# Patient Record
Sex: FEMALE | Race: BLACK OR AFRICAN AMERICAN | Employment: OTHER | URBAN - METROPOLITAN AREA
[De-identification: names, ages, dates, MRNs, and addresses within clinical notes are randomized per-mention and may not be internally consistent; named-entity substitution may affect disease eponyms.]

---

## 2019-01-18 ENCOUNTER — OP HISTORICAL/CONVERTED ENCOUNTER (OUTPATIENT)
Dept: OTHER | Age: 69
End: 2019-01-18

## 2019-02-19 ENCOUNTER — OP HISTORICAL/CONVERTED ENCOUNTER (OUTPATIENT)
Dept: OTHER | Age: 69
End: 2019-02-19

## 2019-08-02 ENCOUNTER — OP HISTORICAL/CONVERTED ENCOUNTER (OUTPATIENT)
Dept: OTHER | Age: 69
End: 2019-08-02

## 2020-11-20 PROBLEM — E55.9 VITAMIN D DEFICIENCY: Status: ACTIVE | Noted: 2020-11-20

## 2020-11-20 PROBLEM — Z12.9 SCREENING FOR CANCER: Status: ACTIVE | Noted: 2020-11-20

## 2020-11-20 PROBLEM — N76.0 ACUTE VAGINITIS: Status: ACTIVE | Noted: 2020-11-20

## 2020-11-20 PROBLEM — F17.200 NICOTINE DEPENDENCE: Status: ACTIVE | Noted: 2020-11-20

## 2020-11-20 PROBLEM — M85.80 OSTEOPENIA: Status: ACTIVE | Noted: 2020-11-20

## 2020-11-20 PROBLEM — E78.00 PURE HYPERCHOLESTEROLEMIA: Status: ACTIVE | Noted: 2020-11-20

## 2020-11-20 PROBLEM — R53.83 FATIGUE: Status: ACTIVE | Noted: 2020-11-20

## 2020-11-20 PROBLEM — F17.210 CIGARETTE SMOKER: Status: ACTIVE | Noted: 2020-11-20

## 2020-11-23 ENCOUNTER — OFFICE VISIT (OUTPATIENT)
Dept: INTERNAL MEDICINE CLINIC | Age: 70
End: 2020-11-23
Payer: MEDICARE

## 2020-11-23 VITALS
RESPIRATION RATE: 18 BRPM | SYSTOLIC BLOOD PRESSURE: 128 MMHG | HEIGHT: 66 IN | HEART RATE: 85 BPM | BODY MASS INDEX: 21.24 KG/M2 | WEIGHT: 132.2 LBS | DIASTOLIC BLOOD PRESSURE: 82 MMHG | TEMPERATURE: 99.3 F | OXYGEN SATURATION: 97 %

## 2020-11-23 DIAGNOSIS — Z13.6 ENCOUNTER FOR ABDOMINAL AORTIC ANEURYSM (AAA) SCREENING: ICD-10-CM

## 2020-11-23 DIAGNOSIS — M85.88 OSTEOPENIA OF LUMBAR SPINE: ICD-10-CM

## 2020-11-23 DIAGNOSIS — F17.210 CIGARETTE NICOTINE DEPENDENCE WITHOUT COMPLICATION: ICD-10-CM

## 2020-11-23 DIAGNOSIS — Z00.00 MEDICARE ANNUAL WELLNESS VISIT, SUBSEQUENT: Primary | ICD-10-CM

## 2020-11-23 PROCEDURE — G0439 PPPS, SUBSEQ VISIT: HCPCS | Performed by: INTERNAL MEDICINE

## 2020-11-23 RX ORDER — CHOLECALCIFEROL (VITAMIN D3) 125 MCG
1 CAPSULE ORAL DAILY
COMMUNITY

## 2020-11-23 RX ORDER — IBUPROFEN 200 MG
1 TABLET ORAL EVERY 24 HOURS
Qty: 30 PATCH | Refills: 1 | Status: SHIPPED | OUTPATIENT
Start: 2020-11-23 | End: 2020-12-23

## 2020-11-23 NOTE — PROGRESS NOTES
This is the Subsequent Medicare Annual Wellness Exam, performed 12 months or more after the Initial AWV or the last Subsequent AWV    I have reviewed the patient's medical history in detail and updated the computerized patient record. /82 (BP 1 Location: Left arm, BP Patient Position: Sitting)   Pulse 85   Temp 99.3 °F (37.4 °C) (Oral)   Resp 18   Ht 5' 6\" (1.676 m)   Wt 132 lb 3.2 oz (60 kg)   SpO2 97%   BMI 21.34 kg/m²      Review of Systems   Constitutional: Negative. HENT: Negative for congestion, hearing loss and sore throat. Eyes: Negative for blurred vision and double vision. Respiratory: Negative for cough, sputum production, shortness of breath and wheezing. Cardiovascular: Negative. Gastrointestinal: Negative. Genitourinary: Negative. Musculoskeletal: Negative. Neurological: Negative for dizziness, tingling and headaches. Endo/Heme/Allergies: Negative for polydipsia. Psychiatric/Behavioral: Negative for depression and substance abuse. The patient is not nervous/anxious and does not have insomnia. Depression Risk Factor Screening:     3 most recent PHQ Screens 11/23/2020   Little interest or pleasure in doing things Not at all   Feeling down, depressed, irritable, or hopeless Not at all   Total Score PHQ 2 0       Alcohol Risk Screen   Do you average more than 1 drink per night or more than 7 drinks a week:  No    On any one occasion in the past three months have you have had more than 3 drinks containing alcohol:  No        Functional Ability and Level of Safety:   Hearing: Hearing is good. Activities of Daily Living: The home contains: no safety equipment. Patient does total self care     Ambulation: with no difficulty     Fall Risk:  Fall Risk Assessment, last 12 mths 11/23/2020   Able to walk? Yes   Fall in past 12 months?  No     Abuse Screen:  Patient is not abused       Cognitive Screening   Has your family/caregiver stated any concerns about your memory: no     Cognitive Screening: Normal - Clock Drawing Test    Assessment/Plan   Education and counseling provided:  Are appropriate based on today's review and evaluation  End-of-Life planning (with patient's consent)  Pneumococcal Vaccine  Influenza Vaccine  Screening Mammography  Bone mass measurement (DEXA)  Screening for glaucoma    Diagnoses and all orders for this visit:    1. Medicare annual wellness visit, subsequent  -     CT LOW DOSE LUNG CANCER SCREENING; Future  -     US EXAM SCREENING AAA; Future    2. Cigarette nicotine dependence without complication  -     CT LOW DOSE LUNG CANCER SCREENING; Future    3. Osteopenia of lumbar spine    4. Encounter for abdominal aortic aneurysm (AAA) screening  -     DUPLEX AORTA/IVC/ILIAC/GRAFTS COMPLETE; Future  -     US EXAM SCREENING AAA; Future    Other orders  -     nicotine (NICODERM CQ) 21 mg/24 hr; 1 Patch by TransDERmal route every twenty-four (24) hours for 30 days. She declined for all age-appropriate preventive vaccinations and she is going to think over for tetanus vaccine,. According to her she has done her mammogram in September 2020 at [facility] and she will bring the copy of the results to me she has done Cologuard test, and she is negative.,  She has done bone density in February 2019 and she has osteopenia in the lumbar spine and femur, she does not want to be on bisphosphonates or injection and she wants to just continue vitamin D and calcium. ,  She has done her labs in May 2020,  her blood pressure is well controlled. She is smoking about 1 pack of cigarettes lasting for 2 days and so far she is smoking about 144 packs/year she agreed as a shared decision to do low-dose lung CT scan is baseline, I explained and  we made a shared decision with her consent I ordered low-dose CT lung screening, also, she wants to do AAA screening, I ordered.   Continued on vitamin D and calcium and her blood pressure is wonderfully controlled without being on any antihypertensives. She has no depression. She is independent for ADL and IADL she has done her Mini-Mental test including 3 words recall and clock drawing test which is normal.  She told me she is going to make appointment with ophthalmologist.  She has no hearing or vision complaints. She does not drink alcohol, daily. Answered all her questions. Follow-up in 1 year. She has her labs done in May 2020 and I have reviewed and discussed with her she does not want to do labs for now.         Health Maintenance Due     Health Maintenance Due   Topic Date Due    Hepatitis C Screening  1950    DTaP/Tdap/Td series (1 - Tdap) 1971    Lipid Screen  1990    Shingrix Vaccine Age 50> (1 of 2) 2000    Colorectal Cancer Screening Combo  2000    Breast Cancer Screen Mammogram  2000    GLAUCOMA SCREENING Q2Y  2015    Bone Densitometry (Dexa) Screening  2015    Pneumococcal 65+ years (1 of 1 - PPSV23) 2015    Medicare Yearly Exam  2020       Patient Care Team   Patient Care Team:  Amanda Saini MD as PCP - General (Internal Medicine)    History     Patient Active Problem List   Diagnosis Code    Acute vaginitis N76.0    Cigarette smoker F17.210    Fatigue R53.83    Nicotine dependence F17.200    Osteopenia M85.80    Pure hypercholesterolemia E78.00    Screening for cancer Z12.9    Vitamin D deficiency E55.9    Medicare annual wellness visit, subsequent Z00.00    Encounter for abdominal aortic aneurysm (AAA) screening Z13.6     Past Medical History:   Diagnosis Date    Acute vaginitis 2020    Cigarette smoker 2020    Fatigue 2020    Nicotine dependence 2020    Osteopenia 2020    Pure hypercholesterolemia 2020    Screening for cancer 2020    Vitamin D deficiency 2020      Past Surgical History:   Procedure Laterality Date    HX  SECTION      HX COLONOSCOPY  2013     Current Outpatient Medications   Medication Sig Dispense Refill    calcium carbonate/vitamin D3 (CALTRATE 600 + D PO) Take 1 Tab by mouth daily.  cholecalciferol, vitamin D3, 50 mcg (2,000 unit) tab Take 1 Tab by mouth daily.  multivit/iron/FA/K/herb no. 244 (ALIVE WOMEN'S ENERGY PO) Take 1 Tab by mouth daily.  nicotine (NICODERM CQ) 21 mg/24 hr 1 Patch by TransDERmal route every twenty-four (24) hours for 30 days.  30 Patch 1     Allergies   Allergen Reactions    Influenza Vaccine Tri-Sp 09-10 Vertigo       Family History   Problem Relation Age of Onset    Hypertension Mother      Social History     Tobacco Use    Smoking status: Current Every Day Smoker     Packs/day: 0.50     Years: 10.00     Pack years: 5.00    Smokeless tobacco: Never Used   Substance Use Topics    Alcohol use: Not Currently

## 2020-11-23 NOTE — PATIENT INSTRUCTIONS
Medicare Wellness Visit, Female The best way to live healthy is to have a lifestyle where you eat a well-balanced diet, exercise regularly, limit alcohol use, and quit all forms of tobacco/nicotine, if applicable. Regular preventive services are another way to keep healthy. Preventive services (vaccines, screening tests, monitoring & exams) can help personalize your care plan, which helps you manage your own care. Screening tests can find health problems at the earliest stages, when they are easiest to treat. [facility] follows the current, evidence-based guidelines published by the [facility] Louie Schafer ([facility]) when recommending preventive services for our patients. Because we follow these guidelines, sometimes recommendations change over time as research supports it. (For example, mammograms used to be recommended annually. Even though Medicare will still pay for an annual mammogram, the newer guidelines recommend a mammogram every two years for women of average risk). Of course, you and your doctor may decide to screen more often for some diseases, based on your risk and your co-morbidities (chronic disease you are already diagnosed with). Preventive services for you include: - Medicare offers their members a free annual wellness visit, which is time for you and your primary care provider to discuss and plan for your preventive service needs. Take advantage of this benefit every year! 
-All adults over the age of 72 should receive the recommended pneumonia vaccines. Current USPSTF guidelines recommend a series of two vaccines for the best pneumonia protection.  
-All adults should have a flu vaccine yearly and a tetanus vaccine every 10 years.  
-All adults age 48 and older should receive the shingles vaccines (series of two vaccines). -All adults age 38-68 who are overweight should have a diabetes screening test once every three years. -All adults born between [birth year] and 1965 should be screened once for Hepatitis C. 
-Other screening tests and preventive services for persons with diabetes include: an eye exam to screen for diabetic retinopathy, a kidney function test, a foot exam, and stricter control over your cholesterol.  
-Cardiovascular screening for adults with routine risk involves an electrocardiogram (ECG) at intervals determined by your doctor.  
-Colorectal cancer screenings should be done for adults age 54-65 with no increased risk factors for colorectal cancer. There are a number of acceptable methods of screening for this type of cancer. Each test has its own benefits and drawbacks. Discuss with your doctor what is most appropriate for you during your annual wellness visit. The different tests include: colonoscopy (considered the best screening method), a fecal occult blood test, a fecal DNA test, and sigmoidoscopy. 
 
-A bone mass density test is recommended when a woman turns 65 to screen for osteoporosis. This test is only recommended one time, as a screening. Some providers will use this same test as a disease monitoring tool if you already have osteoporosis. -Breast cancer screenings are recommended every other year for women of normal risk, age 54-69. 
-Cervical cancer screenings for women over age 72 are only recommended with certain risk factors. Here is a list of your current Health Maintenance items (your personalized list of preventive services) with a due date: 
Health Maintenance Due Topic Date Due  
 Hepatitis C Test  1950  DTaP/Tdap/Td  (1 - Tdap) 02/06/1971  Cholesterol Test   02/06/1990  Shingles Vaccine (1 of 2) 02/06/2000  Colorectal Screening  02/06/2000  Mammogram  02/06/2000  Glaucoma Screening   02/06/2015  Bone Mineral Density   02/06/2015  Pneumococcal Vaccine (1 of 1 - PPSV23) 02/06/2015 Nancy Pham Annual Well Visit  04/16/2020

## 2020-12-20 PROBLEM — Z12.9 SCREENING FOR CANCER: Status: RESOLVED | Noted: 2020-11-20 | Resolved: 2020-12-20

## 2020-12-23 PROBLEM — Z13.6 ENCOUNTER FOR ABDOMINAL AORTIC ANEURYSM (AAA) SCREENING: Status: RESOLVED | Noted: 2020-11-23 | Resolved: 2020-12-23

## 2020-12-23 PROBLEM — Z00.00 MEDICARE ANNUAL WELLNESS VISIT, SUBSEQUENT: Status: RESOLVED | Noted: 2020-11-23 | Resolved: 2020-12-23

## 2021-01-17 DIAGNOSIS — R91.8 ABNORMAL CT LUNG SCREENING: Primary | ICD-10-CM

## 2021-01-17 DIAGNOSIS — R93.89 ABNORMAL FINDING ON CT SCAN: ICD-10-CM

## 2021-01-17 DIAGNOSIS — Z00.00 MEDICARE ANNUAL WELLNESS VISIT, SUBSEQUENT: ICD-10-CM

## 2021-01-17 DIAGNOSIS — F17.210 CIGARETTE NICOTINE DEPENDENCE WITHOUT COMPLICATION: ICD-10-CM

## 2021-01-17 NOTE — PROGRESS NOTES
Please call her that she has abnormal findings and radiologist is not sure also incidental finding in liver she needs CT abdomen and pelvis with and without IV contrast multiphasic to rule out any serious pathology also we will refer her to oncologist to keep surveillance regarding different very small size nodules throughout the lung and not sure whether it is granulomatous, versus, anything serious and she needs, CT lung again in 6 months, but CT abdomen and pelvis to be done, as soon as possible and I have placed orders in the computer.   In the interval time she should quit smoking cigarette,

## 2021-01-17 NOTE — PROGRESS NOTES
I ordered CT abdomen pelvis with and without contrast as per the recommendation by radiologist who has done low-dose lung CT under Medicare wellness visit having history of smoking.

## 2021-01-19 ENCOUNTER — TELEPHONE (OUTPATIENT)
Dept: INTERNAL MEDICINE CLINIC | Age: 71
End: 2021-01-19

## 2021-01-19 NOTE — TELEPHONE ENCOUNTER
----- Message from Sanket Flores MD sent at 1/17/2021  2:16 PM EST -----  Please call her that she has abnormal findings and radiologist is not sure also incidental finding in liver she needs CT abdomen and pelvis with and without IV contrast multiphasic to rule out any serious pathology also we will refer her to oncologist to keep surveillance regarding different very small size nodules throughout the lung and not sure whether it is granulomatous, versus, anything serious and she needs, CT lung again in 6 months, but CT abdomen and pelvis to be done, as soon as possible and I have placed orders in the computer.   In the interval time she should quit smoking cigarette,

## 2021-01-20 NOTE — TELEPHONE ENCOUNTER
Spoke to patient and advised of results and additional imaging needed. New order faxed to [facility] at [phone].

## 2021-01-25 PROBLEM — R91.8 ABNORMAL CT LUNG SCREENING: Status: ACTIVE | Noted: 2021-01-25

## 2021-01-26 ENCOUNTER — OFFICE VISIT (OUTPATIENT)
Dept: INTERNAL MEDICINE CLINIC | Age: 71
End: 2021-01-26
Payer: MEDICARE

## 2021-01-26 VITALS
SYSTOLIC BLOOD PRESSURE: 114 MMHG | HEART RATE: 80 BPM | RESPIRATION RATE: 18 BRPM | DIASTOLIC BLOOD PRESSURE: 62 MMHG | BODY MASS INDEX: 18.96 KG/M2 | WEIGHT: 118 LBS | HEIGHT: 66 IN | OXYGEN SATURATION: 99 %

## 2021-01-26 DIAGNOSIS — R91.8 ABNORMAL CT LUNG SCREENING: ICD-10-CM

## 2021-01-26 DIAGNOSIS — F17.219 CIGARETTE NICOTINE DEPENDENCE WITH NICOTINE-INDUCED DISORDER: ICD-10-CM

## 2021-01-26 DIAGNOSIS — R11.0 NAUSEA: ICD-10-CM

## 2021-01-26 DIAGNOSIS — R63.0 LOSS OF APPETITE: ICD-10-CM

## 2021-01-26 DIAGNOSIS — R63.4 WEIGHT LOSS: ICD-10-CM

## 2021-01-26 PROCEDURE — G8510 SCR DEP NEG, NO PLAN REQD: HCPCS | Performed by: INTERNAL MEDICINE

## 2021-01-26 PROCEDURE — G8427 DOCREV CUR MEDS BY ELIG CLIN: HCPCS | Performed by: INTERNAL MEDICINE

## 2021-01-26 PROCEDURE — G8536 NO DOC ELDER MAL SCRN: HCPCS | Performed by: INTERNAL MEDICINE

## 2021-01-26 PROCEDURE — G8420 CALC BMI NORM PARAMETERS: HCPCS | Performed by: INTERNAL MEDICINE

## 2021-01-26 PROCEDURE — G8400 PT W/DXA NO RESULTS DOC: HCPCS | Performed by: INTERNAL MEDICINE

## 2021-01-26 PROCEDURE — 1090F PRES/ABSN URINE INCON ASSESS: CPT | Performed by: INTERNAL MEDICINE

## 2021-01-26 PROCEDURE — 99214 OFFICE O/P EST MOD 30 MIN: CPT | Performed by: INTERNAL MEDICINE

## 2021-01-26 PROCEDURE — 3017F COLORECTAL CA SCREEN DOC REV: CPT | Performed by: INTERNAL MEDICINE

## 2021-01-26 PROCEDURE — 1101F PT FALLS ASSESS-DOCD LE1/YR: CPT | Performed by: INTERNAL MEDICINE

## 2021-01-26 RX ORDER — OMEPRAZOLE 40 MG/1
40 CAPSULE, DELAYED RELEASE ORAL DAILY
Qty: 30 CAP | Refills: 2 | Status: SHIPPED | OUTPATIENT
Start: 2021-01-26

## 2021-01-26 RX ORDER — OMEPRAZOLE 40 MG/1
40 CAPSULE, DELAYED RELEASE ORAL DAILY
Qty: 90 CAP | Refills: 0 | Status: SHIPPED | OUTPATIENT
Start: 2021-01-26

## 2021-01-26 NOTE — PROGRESS NOTES
Roseanne Still is a 79 y.o. female and presents with Weight Loss and Decreased Appetite    Ms. Marquise Orantes came to discuss her low-dose lung CT scan that was done for screening lung cancer having history of smoking cigarette 1 pack a day in the past but now she has not smoked almost for last 2 weeks. According to her she has no good test and she cannot eat large portion at 1 time so sometimes she gets hungry in midnight. She has weight loss that I checked when I reviewed her vitals she has abnormal CT scan of the lung and showing some lesion in the liver and adrenal gland, having weight loss and loss of appetite and history of smoking cigarette I referred her for CT abdomen and pelvis as per recommendation by radiologist, she agreed to do in [facility]. AT [place], I referred her to gastroenterologist because she had done Cologuard and she will need EGD and colonoscopy having lack of appetite and having early satiety,. She has no depression. She lives by herself and independent for ADL and IADL. No blood in stool or urine. No vomiting. But she told me she had some nausea 3 days ago. No detail,good remote history available  Review of Systems    Review of Systems   Constitutional: Positive for malaise/fatigue. Weight loss   HENT: Negative for sinus pain and sore throat. Eyes: Negative for blurred vision. Respiratory: Negative for cough, sputum production, shortness of breath and wheezing. Cardiovascular: Negative. Gastrointestinal: Positive for nausea. Loss of appetite   Genitourinary: Negative. Musculoskeletal: Negative. Neurological: Negative for dizziness, tingling, tremors and headaches. Psychiatric/Behavioral: Negative for depression and memory loss. The patient is not nervous/anxious.          Past Medical History:   Diagnosis Date    Acute vaginitis 11/20/2020    Cigarette smoker 11/20/2020    Fatigue 11/20/2020    Nicotine dependence 11/20/2020    Osteopenia 2020    Pure hypercholesterolemia 2020    Screening for cancer 2020    Vitamin D deficiency 2020     Past Surgical History:   Procedure Laterality Date    HX  SECTION  1982    HX COLONOSCOPY       Social History     Socioeconomic History    Marital status: SINGLE     Spouse name: Not on file    Number of children: Not on file    Years of education: Not on file    Highest education level: Not on file   Tobacco Use    Smoking status: Current Every Day Smoker     Packs/day: 0.50     Years: 10.00     Pack years: 5.00    Smokeless tobacco: Never Used   Substance and Sexual Activity    Alcohol use: Not Currently     Family History   Problem Relation Age of Onset    Hypertension Mother      Current Outpatient Medications   Medication Sig Dispense Refill    omeprazole (PRILOSEC) 40 mg capsule Take 1 Cap by mouth daily. Take 30 minutes before eating. once a day. 30 Cap 2    omeprazole (PRILOSEC) 40 mg capsule Take 1 Cap by mouth daily. Take one pill 30 minutes before eating once a day 90 Cap 0    cholecalciferol, vitamin D3, 50 mcg (2,000 unit) tab Take 1 Tab by mouth daily.  multivit/iron/FA/K/herb no. 244 (ALIVE WOMEN'S ENERGY PO) Take 1 Tab by mouth daily.  calcium carbonate/vitamin D3 (CALTRATE 600 + D PO) Take 1 Tab by mouth daily. Allergies   Allergen Reactions    Influenza Vaccine Tri-Sp 09-10 Vertigo       Objective:  Visit Vitals  /62 (BP 1 Location: Right arm, BP Patient Position: Sitting)   Pulse 80   Resp 18   Ht 5' 6\" (1.676 m)   Wt 118 lb (53.5 kg)   SpO2 99%   BMI 19.05 kg/m²       Physical Exam:   Constitutional: General Appearance: Pleasant. Level of Distress: NAD. Psychiatric: Mental Status: normal mood and affect Orientation: to time, place, and person. ,normal eye contact. Head: Head: normocephalic and atraumatic. Eyes: Pupils: PERRLA. Sclerae: non-icteric. Neck: Neck: supple, trachea midline, and no masses.  Lymph Nodes: no cervical LAD. Thyroid: no enlargement or nodules and non-tender. Lungs: Respiratory effort: no dyspnea. Auscultation: no wheezing, rales/crackles, or rhonchi and breath sounds normal and good air movement. Cardiovascular: Apical Impulse: not displaced. Heart Auscultation: normal S1 and S2; no murmurs, rubs, or gallops; and RRR. Neck vessels: no carotid bruits. Pulses including femoral / pedal: normal throughout. Abdomen: Bowel Sounds: normal. Inspection and Palpation: no tenderness, guarding, or masses and soft and non-distended. Liver: non-tender and no hepatomegaly. Spleen: non-tender and no splenomegaly. Musculoskeletal[de-identified] Extremities: no edema,no varicosities. No Calf tenderness. Neurologic: Gait and Station: normal gait and station. Motor Strength normal right and left. Skin: Inspection and palpation: no rash, lesions, or ulcer. No results found for this or any previous visit. Assessment/Plan:      ICD-10-CM ICD-9-CM    1. Abnormal CT lung screening  R91.8 793.19 REFERRAL TO HEMATOLOGY ONCOLOGY      REFERRAL TO GASTROENTEROLOGY   2. Weight loss  R63.4 783.21 REFERRAL TO HEMATOLOGY ONCOLOGY      REFERRAL TO GASTROENTEROLOGY      CBC WITH AUTOMATED DIFF      METABOLIC PANEL, COMPREHENSIVE      TSH 3RD GENERATION      SED RATE (ESR)      AMYLASE      LIPASE      T4, FREE   3. Loss of appetite  R63.0 783.0 CBC WITH AUTOMATED DIFF      METABOLIC PANEL, COMPREHENSIVE      TSH 3RD GENERATION      SED RATE (ESR)      AMYLASE      LIPASE      T4, FREE   4.  Cigarette nicotine dependence with nicotine-induced disorder  F17.219 292.9 REFERRAL TO HEMATOLOGY ONCOLOGY      REFERRAL TO GASTROENTEROLOGY   5. Nausea  R11.0 787.02 CBC WITH AUTOMATED DIFF      METABOLIC PANEL, COMPREHENSIVE      TSH 3RD GENERATION      SED RATE (ESR)      AMYLASE      LIPASE     Orders Placed This Encounter    CBC WITH AUTOMATED DIFF    METABOLIC PANEL, COMPREHENSIVE    TSH 3RD GENERATION    SED RATE (ESR)    AMYLASE    LIPASE    T4, FREE    REFERRAL TO HEMATOLOGY ONCOLOGY     Referral Priority:   Routine     Referral Type:   Consultation     Referral Reason:   Specialty Services Required     Referred to Provider:   Manpreet Davis MD     Requested Specialty:   Hematology and Oncology     Number of Visits Requested:   1    REFERRAL TO GASTROENTEROLOGY     Referral Priority:   Routine     Referral Type:   Consultation     Referral Reason:   Specialty Services Required     Referred to Provider:   Leroy Benton MD     Requested Specialty:   Gastroenterology     Number of Visits Requested:   1    omeprazole (PRILOSEC) 40 mg capsule     Sig: Take 1 Cap by mouth daily. Take 30 minutes before eating. once a day. Dispense:  30 Cap     Refill:  2    omeprazole (PRILOSEC) 40 mg capsule     Sig: Take 1 Cap by mouth daily.  Take one pill 30 minutes before eating once a day     Dispense:  90 Cap     Refill:  0     Lack of appetite also weight loss noticed by me,, patient is not providing history but now she says she gets tired, fatigue and not having good taste in her mouth, and no history of fever chills or exposure to COVID-19, having history of smoking cigarette used to smoke 1 pack a day but for last 2 weeks has not smoked because of no taste in the mouth, and also I had ordered a, low-dose lung CT scan which shows 2 to 3 mm pulmonary nodules and adrenal gland swelling, and also lesion in the right side of the liver so radiologist has also recommended to do CT abdomen and pelvis, to rule out metastatic disease versus granulomatous disease and, she did Cologuard but did not do colonoscopy and she has all the, symptoms so we have to rule out metastatic diseasefor CT abdomen and pelvis with and without contrast which is scheduled tomorrow,, also referred her to gastroenterologist that she will need EGD and colonoscopy and further evaluation and work-up, also referred her to oncologist, for further work-up, meanwhile started on omeprazole 40 mg once a day 30 minutes before eating, she has no nausea vomiting but at one time she had nausea but not complaining,, consistently so I will wait if needed I will give her symptomatic treatment with ondansetron, she is taking multivitamins,According to her she has no depression her blood pressure is controlled,, labs ordered, I explained in details I went to her CT lung results, line by line, in details, tried to explain in, simple language and she agreed to do everything. Explained that she has multiple punctate nodules throughout the lung and also largest in the  liver and adrenal gland with probably adrenal adenoma and very high suspicious for metastatic disease. Follow-up in 3 months, she is going to see various specialist  so I will see her after seen by all specialist otherwise she can call me as needed. I spoke with Oscar Walters, on phone explained history, he will try to schedule her as soon as possible, also I gave her office fax number so that we can get  consult notes from him. continue present plan, routine labs ordered, call if any problems    There are no Patient Instructions on file for this visit.    Follow-up and Dispositions    · Return in about 2 months (around 3/26/2021) for abdomen and pelvis ct and nonfasting labs now and ref gastro and onco.   Follow-up and Disposition History

## 2021-01-27 ENCOUNTER — HOSPITAL ENCOUNTER (OUTPATIENT)
Dept: CT IMAGING | Age: 71
Discharge: HOME | End: 2021-01-27
Attending: INTERNAL MEDICINE

## 2021-01-27 DIAGNOSIS — R91.8 ABNORMAL CT LUNG SCREENING: ICD-10-CM

## 2021-01-27 DIAGNOSIS — R93.89 ABNORMAL FINDING ON CT SCAN: ICD-10-CM

## 2021-01-27 LAB
ALBUMIN SERPL-MCNC: 3.2 G/DL (ref 3.8–4.8)
ALBUMIN/GLOB SERPL: 0.9 {RATIO} (ref 1.2–2.2)
ALP SERPL-CCNC: 256 IU/L (ref 39–117)
ALT SERPL-CCNC: 31 IU/L (ref 0–32)
AMYLASE SERPL-CCNC: 72 U/L (ref 31–110)
AST SERPL-CCNC: 43 IU/L (ref 0–40)
BASOPHILS # BLD AUTO: 0 X10E3/UL (ref 0–0.2)
BASOPHILS NFR BLD AUTO: 0 %
BILIRUB SERPL-MCNC: 0.5 MG/DL (ref 0–1.2)
BUN SERPL-MCNC: 14 MG/DL (ref 8–27)
BUN/CREAT SERPL: 25 (ref 12–28)
CALCIUM SERPL-MCNC: 9.1 MG/DL (ref 8.7–10.3)
CHLORIDE SERPL-SCNC: 93 MMOL/L (ref 96–106)
CO2 SERPL-SCNC: 22 MMOL/L (ref 20–29)
CREAT SERPL-MCNC: 0.56 MG/DL (ref 0.57–1)
EOSINOPHIL # BLD AUTO: 0 X10E3/UL (ref 0–0.4)
EOSINOPHIL NFR BLD AUTO: 0 %
ERYTHROCYTE [DISTWIDTH] IN BLOOD BY AUTOMATED COUNT: 14.4 % (ref 11.7–15.4)
ERYTHROCYTE [SEDIMENTATION RATE] IN BLOOD BY WESTERGREN METHOD: 75 MM/HR (ref 0–40)
GLOBULIN SER CALC-MCNC: 3.6 G/DL (ref 1.5–4.5)
GLUCOSE SERPL-MCNC: 115 MG/DL (ref 65–99)
HCT VFR BLD AUTO: 30.1 % (ref 34–46.6)
HGB BLD-MCNC: 9.1 G/DL (ref 11.1–15.9)
IMM GRANULOCYTES # BLD AUTO: 0.1 X10E3/UL (ref 0–0.1)
IMM GRANULOCYTES NFR BLD AUTO: 1 %
LIPASE SERPL-CCNC: 19 U/L (ref 14–72)
LYMPHOCYTES # BLD AUTO: 1.3 X10E3/UL (ref 0.7–3.1)
LYMPHOCYTES NFR BLD AUTO: 9 %
MCH RBC QN AUTO: 22.6 PG (ref 26.6–33)
MCHC RBC AUTO-ENTMCNC: 30.2 G/DL (ref 31.5–35.7)
MCV RBC AUTO: 75 FL (ref 79–97)
MONOCYTES # BLD AUTO: 1.3 X10E3/UL (ref 0.1–0.9)
MONOCYTES NFR BLD AUTO: 8 %
NEUTROPHILS # BLD AUTO: 12.7 X10E3/UL (ref 1.4–7)
NEUTROPHILS NFR BLD AUTO: 82 %
PLATELET # BLD AUTO: 695 X10E3/UL (ref 150–450)
POTASSIUM SERPL-SCNC: 4.3 MMOL/L (ref 3.5–5.2)
PROT SERPL-MCNC: 6.8 G/DL (ref 6–8.5)
RBC # BLD AUTO: 4.02 X10E6/UL (ref 3.77–5.28)
SODIUM SERPL-SCNC: 132 MMOL/L (ref 134–144)
T4 FREE SERPL-MCNC: 1.2 NG/DL (ref 0.82–1.77)
TSH SERPL DL<=0.005 MIU/L-ACNC: 1.78 UIU/ML (ref 0.45–4.5)
WBC # BLD AUTO: 15.5 X10E3/UL (ref 3.4–10.8)

## 2021-01-27 NOTE — PROGRESS NOTES
Pl call her she needs to go to er having elevated wbc and abnormal lft with weight loss and loss of appetite and abnormal ct

## 2021-01-28 ENCOUNTER — TELEPHONE (OUTPATIENT)
Dept: INTERNAL MEDICINE CLINIC | Age: 71
End: 2021-01-28

## 2021-01-28 NOTE — TELEPHONE ENCOUNTER
Called the patient 01/27/21 and 01/28/21 and left message for her to call the office to discuss labs.

## 2021-01-28 NOTE — TELEPHONE ENCOUNTER
----- Message from Kaye Ramesh MD sent at 1/27/2021  4:28 PM EST -----  Pl call her she needs to go to er having elevated wbc and abnormal lft with weight loss and loss of appetite and abnormal ct

## 2021-01-29 ENCOUNTER — HOSPITAL ENCOUNTER (EMERGENCY)
Age: 71
Discharge: HOME | End: 2021-01-29
Attending: EMERGENCY MEDICINE | Admitting: EMERGENCY MEDICINE
Payer: MEDICARE

## 2021-01-29 ENCOUNTER — APPOINTMENT (OUTPATIENT)
Dept: CT IMAGING | Age: 71
End: 2021-01-29
Attending: EMERGENCY MEDICINE
Payer: MEDICARE

## 2021-01-29 VITALS
DIASTOLIC BLOOD PRESSURE: 76 MMHG | HEART RATE: 83 BPM | WEIGHT: 118 LBS | HEIGHT: 66 IN | OXYGEN SATURATION: 99 % | SYSTOLIC BLOOD PRESSURE: 145 MMHG | RESPIRATION RATE: 26 BRPM | BODY MASS INDEX: 18.96 KG/M2 | TEMPERATURE: 98.7 F

## 2021-01-29 DIAGNOSIS — C79.9 METASTATIC MALIGNANT NEOPLASM, UNSPECIFIED SITE (HCC): ICD-10-CM

## 2021-01-29 DIAGNOSIS — K63.89 COLONIC MASS: ICD-10-CM

## 2021-01-29 DIAGNOSIS — D75.839 THROMBOCYTOSIS: ICD-10-CM

## 2021-01-29 DIAGNOSIS — R63.4 WEIGHT LOSS: Primary | ICD-10-CM

## 2021-01-29 LAB
ABO + RH BLD: NORMAL
ALBUMIN SERPL-MCNC: 2.2 G/DL (ref 3.5–5)
ALBUMIN/GLOB SERPL: 0.4 {RATIO} (ref 1.1–2.2)
ALP SERPL-CCNC: 282 U/L (ref 45–117)
ALT SERPL-CCNC: 33 U/L (ref 12–78)
ANION GAP SERPL CALC-SCNC: 8 MMOL/L (ref 5–15)
APTT PPP: 31.1 SEC (ref 23–35.7)
AST SERPL W P-5'-P-CCNC: 60 U/L (ref 15–37)
BASOPHILS # BLD: 0 K/UL (ref 0–0.1)
BASOPHILS NFR BLD: 0 % (ref 0–1)
BILIRUB SERPL-MCNC: 0.3 MG/DL (ref 0.2–1)
BLOOD GROUP ANTIBODIES SERPL: NEGATIVE
BUN SERPL-MCNC: 15 MG/DL (ref 6–20)
BUN/CREAT SERPL: 27 (ref 12–20)
CA-I BLD-MCNC: 8.8 MG/DL (ref 8.5–10.1)
CHLORIDE SERPL-SCNC: 98 MMOL/L (ref 97–108)
CO2 SERPL-SCNC: 28 MMOL/L (ref 21–32)
CREAT SERPL-MCNC: 0.56 MG/DL (ref 0.55–1.02)
DIFFERENTIAL METHOD BLD: ABNORMAL
EOSINOPHIL # BLD: 0.1 K/UL (ref 0–0.4)
EOSINOPHIL NFR BLD: 1 % (ref 0–7)
ERYTHROCYTE [DISTWIDTH] IN BLOOD BY AUTOMATED COUNT: 15.9 % (ref 11.5–14.5)
GLOBULIN SER CALC-MCNC: 5 G/DL (ref 2–4)
GLUCOSE SERPL-MCNC: 97 MG/DL (ref 65–100)
HCT VFR BLD AUTO: 27.7 % (ref 35–47)
HGB BLD-MCNC: 8.2 G/DL (ref 11.5–16)
IMM GRANULOCYTES # BLD AUTO: 0 K/UL (ref 0–0.04)
IMM GRANULOCYTES NFR BLD AUTO: 0 % (ref 0–0.5)
INR PPP: 1.4 (ref 0.9–1.1)
LYMPHOCYTES # BLD: 1.3 K/UL (ref 0.8–3.5)
LYMPHOCYTES NFR BLD: 14 % (ref 12–49)
MCH RBC QN AUTO: 22.3 PG (ref 26–34)
MCHC RBC AUTO-ENTMCNC: 29.6 G/DL (ref 30–36.5)
MCV RBC AUTO: 75.3 FL (ref 80–99)
MONOCYTES # BLD: 1 K/UL (ref 0–1)
MONOCYTES NFR BLD: 10 % (ref 5–13)
NEUTS SEG # BLD: 7.3 K/UL (ref 1.8–8)
NEUTS SEG NFR BLD: 75 % (ref 32–75)
PLATELET # BLD AUTO: 629 K/UL (ref 150–400)
PMV BLD AUTO: 8.2 FL (ref 8.9–12.9)
POTASSIUM SERPL-SCNC: 3.6 MMOL/L (ref 3.5–5.1)
PROT SERPL-MCNC: 7.2 G/DL (ref 6.4–8.2)
PROTHROMBIN TIME: 17.2 SEC (ref 11.9–14.7)
RBC # BLD AUTO: 3.68 M/UL (ref 3.8–5.2)
SODIUM SERPL-SCNC: 134 MMOL/L (ref 136–145)
SPECIMEN EXP DATE BLD: NORMAL
THERAPEUTIC RANGE,PTTT: NORMAL SEC (ref 68–109)
WBC # BLD AUTO: 9.8 K/UL (ref 3.6–11)

## 2021-01-29 PROCEDURE — 74177 CT ABD & PELVIS W/CONTRAST: CPT

## 2021-01-29 PROCEDURE — 85730 THROMBOPLASTIN TIME PARTIAL: CPT

## 2021-01-29 PROCEDURE — 36415 COLL VENOUS BLD VENIPUNCTURE: CPT

## 2021-01-29 PROCEDURE — 85025 COMPLETE CBC W/AUTO DIFF WBC: CPT

## 2021-01-29 PROCEDURE — 85610 PROTHROMBIN TIME: CPT

## 2021-01-29 PROCEDURE — 99285 EMERGENCY DEPT VISIT HI MDM: CPT

## 2021-01-29 PROCEDURE — 74011000636 HC RX REV CODE- 636: Performed by: EMERGENCY MEDICINE

## 2021-01-29 PROCEDURE — 80053 COMPREHEN METABOLIC PANEL: CPT

## 2021-01-29 PROCEDURE — 86901 BLOOD TYPING SEROLOGIC RH(D): CPT

## 2021-01-29 RX ADMIN — IOPAMIDOL 100 ML: 755 INJECTION, SOLUTION INTRAVENOUS at 16:14

## 2021-01-29 NOTE — DISCHARGE INSTRUCTIONS
Thank you! Thank you for allowing me to care for you in the emergency department. I sincerely hope that you are satisfied with your visit today. It is my goal to provide you with excellent care. Below you will find a list of your labs and imaging from your visit today. Should you have any questions regarding these results please do not hesitate to call the emergency department. Labs -     Recent Results (from the past 12 hour(s))   TYPE & SCREEN    Collection Time: 01/29/21 11:45 AM   Result Value Ref Range    Crossmatch Expiration 02/01/2021,2359     ABO/Rh(D) B Positive     Antibody screen Negative    METABOLIC PANEL, COMPREHENSIVE    Collection Time: 01/29/21 12:20 PM   Result Value Ref Range    Sodium 134 (L) 136 - 145 mmol/L    Potassium 3.6 3.5 - 5.1 mmol/L    Chloride 98 97 - 108 mmol/L    CO2 28 21 - 32 mmol/L    Anion gap 8 5 - 15 mmol/L    Glucose 97 65 - 100 mg/dL    BUN 15 6 - 20 mg/dL    Creatinine 0.56 0.55 - 1.02 mg/dL    BUN/Creatinine ratio 27 (H) 12 - 20      GFR est AA >60 >60 ml/min/1.73m2    GFR est non-AA >60 >60 ml/min/1.73m2    Calcium 8.8 8.5 - 10.1 mg/dL    Bilirubin, total 0.3 0.2 - 1.0 mg/dL    AST (SGOT) 60 (H) 15 - 37 U/L    ALT (SGPT) 33 12 - 78 U/L    Alk.  phosphatase 282 (H) 45 - 117 U/L    Protein, total 7.2 6.4 - 8.2 g/dL    Albumin 2.2 (L) 3.5 - 5.0 g/dL    Globulin 5.0 (H) 2.0 - 4.0 g/dL    A-G Ratio 0.4 (L) 1.1 - 2.2     CBC WITH AUTOMATED DIFF    Collection Time: 01/29/21 12:20 PM   Result Value Ref Range    WBC 9.8 3.6 - 11.0 K/uL    RBC 3.68 (L) 3.80 - 5.20 M/uL    HGB 8.2 (L) 11.5 - 16.0 g/dL    HCT 27.7 (L) 35.0 - 47.0 %    MCV 75.3 (L) 80.0 - 99.0 FL    MCH 22.3 (L) 26.0 - 34.0 PG    MCHC 29.6 (L) 30.0 - 36.5 g/dL    RDW 15.9 (H) 11.5 - 14.5 %    PLATELET 293 (H) 166 - 400 K/uL    MPV 8.2 (L) 8.9 - 12.9 FL    NEUTROPHILS 75 32 - 75 %    LYMPHOCYTES 14 12 - 49 %    MONOCYTES 10 5 - 13 %    EOSINOPHILS 1 0 - 7 %    BASOPHILS 0 0 - 1 %    IMMATURE GRANULOCYTES 0 0.0 - 0.5 %    ABS. NEUTROPHILS 7.3 1.8 - 8.0 K/UL    ABS. LYMPHOCYTES 1.3 0.8 - 3.5 K/UL    ABS. MONOCYTES 1.0 0.0 - 1.0 K/UL    ABS. EOSINOPHILS 0.1 0.0 - 0.4 K/UL    ABS. BASOPHILS 0.0 0.0 - 0.1 K/UL    ABS. IMM. GRANS. 0.0 0.00 - 0.04 K/UL    DF AUTOMATED     PROTHROMBIN TIME + INR    Collection Time: 01/29/21 12:20 PM   Result Value Ref Range    Prothrombin time 17.2 (H) 11.9 - 14.7 sec    INR 1.4 (H) 0.9 - 1.1     PTT    Collection Time: 01/29/21 12:20 PM   Result Value Ref Range    aPTT 31.1 23.0 - 35.7 sec    aPTT, therapeutic range   68 - 109 sec       Radiologic Studies -   CT ABD PELV W CONT    (Results Pending)     CT Results  (Last 48 hours)      None          CXR Results  (Last 48 hours)      None               If you feel that you have not received excellent quality care or timely care, please ask to speak to the nurse manager. Please choose us in the future for your continued health care needs. ------------------------------------------------------------------------------------------------------------  The exam and treatment you received in the Emergency Department were for an urgent problem and are not intended as complete care. It is important that you follow-up with a doctor, nurse practitioner, or physician assistant to:  (1) confirm your diagnosis,  (2) re-evaluation of changes in your illness and treatment, and  (3) for ongoing care. If your symptoms become worse or you do not improve as expected and you are unable to reach your usual health care provider, you should return to the Emergency Department. We are available 24 hours a day. Please take your discharge instructions with you when you go to your follow-up appointment. If you have any problem arranging a follow-up appointment, contact the Emergency Department immediately. If a prescription has been provided, please have it filled as soon as possible to prevent a delay in treatment.  Read the entire medication instruction sheet provided to you by the pharmacy. If you have any questions or reservations about taking the medication due to side effects or interactions with other medications, please call your primary care physician or contact the ER to speak with the charge nurse. Make an appointment with your family doctor or the physician you were referred to for follow-up of this visit as instructed on your discharge paperwork, as this is a mandatory follow-up. Return to the ER if you are unable to be seen or if you are unable to be seen in a timely manner. If you have any problem arranging the follow-up visit, contact the Emergency Department immediately.

## 2021-01-29 NOTE — ED NOTES
Pt ANOx4. Pt taken to waiting room in wheelchair with no complications. Respirations regular. NAD noted. Pt received DC Instructions with follow up information and stated no further questions.

## 2021-01-29 NOTE — ED PROVIDER NOTES
EMERGENCY DEPARTMENT HISTORY AND PHYSICAL EXAM      Date: 2021  Patient Name: Yovani Mosley    History of Presenting Illness     Chief Complaint   Patient presents with    Anemia       History Provided By: Patient    HPI: Yovani Mosley, 79 y.o. female with a past medical history significant lung ca presents to the ED with cc of weight loss, and low blood counts for the past week. Patient was sent to the emergency room for evaluation of her abdomen as well as blood counts being repeated. Patient denies any other symptoms at this time states that she is able to tolerate p.o. specifically denies fever, chills, nausea, vomiting, chest pain, shortness of breath, rash, diarrhea, headache, night sweats. There are no other complaints, changes, or physical findings at this time. PCP: Sheri Downey MD    No current facility-administered medications on file prior to encounter. Current Outpatient Medications on File Prior to Encounter   Medication Sig Dispense Refill    omeprazole (PRILOSEC) 40 mg capsule Take 1 Cap by mouth daily. Take 30 minutes before eating. once a day. 30 Cap 2    omeprazole (PRILOSEC) 40 mg capsule Take 1 Cap by mouth daily. Take one pill 30 minutes before eating once a day 90 Cap 0    calcium carbonate/vitamin D3 (CALTRATE 600 + D PO) Take 1 Tab by mouth daily.  cholecalciferol, vitamin D3, 50 mcg (2,000 unit) tab Take 1 Tab by mouth daily.  multivit/iron/FA/K/herb no. 244 (ALIVE WOMEN'S ENERGY PO) Take 1 Tab by mouth daily.          Past History     Past Medical History:  Past Medical History:   Diagnosis Date    Acute vaginitis 2020    Cigarette smoker 2020    Fatigue 2020    Nicotine dependence 2020    Osteopenia 2020    Pure hypercholesterolemia 2020    Screening for cancer 2020    Vitamin D deficiency 2020       Past Surgical History:  Past Surgical History:   Procedure Laterality Date    HX  SECTION    HX COLONOSCOPY  2013       Family History:  Family History   Problem Relation Age of Onset    Hypertension Mother        Social History:  Social History     Tobacco Use    Smoking status: Current Every Day Smoker     Packs/day: 0.50     Years: 10.00     Pack years: 5.00    Smokeless tobacco: Never Used   Substance Use Topics    Alcohol use: Not Currently    Drug use: Not on file       Allergies: Allergies   Allergen Reactions    Influenza Vaccine Tri-Sp 09-10 Vertigo         Review of Systems     Review of Systems   Constitutional: Positive for unexpected weight change. Negative for appetite change, chills, fatigue and fever. HENT: Negative. Negative for congestion and sinus pain. Eyes: Negative. Negative for pain and visual disturbance. Respiratory: Negative. Negative for chest tightness and shortness of breath. Cardiovascular: Negative. Negative for chest pain. Gastrointestinal: Negative. Negative for abdominal pain, diarrhea, nausea and vomiting. Genitourinary: Negative. Negative for difficulty urinating. No discharge   Musculoskeletal: Negative. Negative for arthralgias. Skin: Negative. Negative for rash. Neurological: Negative. Negative for weakness and headaches. Hematological: Negative. Psychiatric/Behavioral: Negative. Negative for agitation. The patient is not nervous/anxious. All other systems reviewed and are negative. Physical Exam     Physical Exam  Vitals signs and nursing note reviewed. Constitutional:       General: She is not in acute distress. Appearance: She is well-developed. HENT:      Head: Normocephalic and atraumatic. Nose: Nose normal.      Mouth/Throat:      Mouth: Mucous membranes are moist.      Pharynx: Oropharynx is clear. No oropharyngeal exudate. Eyes:      General:         Right eye: No discharge. Left eye: No discharge.       Conjunctiva/sclera: Conjunctivae normal.      Pupils: Pupils are equal, round, and reactive to light. Neck:      Musculoskeletal: Normal range of motion and neck supple. Cardiovascular:      Rate and Rhythm: Normal rate and regular rhythm. Chest Wall: PMI is not displaced. No thrill. Heart sounds: Normal heart sounds. No murmur. No friction rub. No gallop. Pulmonary:      Effort: Pulmonary effort is normal. No respiratory distress. Breath sounds: Normal breath sounds. No wheezing or rales. Chest:      Chest wall: No tenderness. Abdominal:      General: Bowel sounds are normal. There is no distension. Palpations: Abdomen is soft. There is no mass. Tenderness: There is no abdominal tenderness. There is no guarding or rebound. Musculoskeletal: Normal range of motion. Lymphadenopathy:      Cervical: No cervical adenopathy. Skin:     General: Skin is warm and dry. Capillary Refill: Capillary refill takes less than 2 seconds. Findings: No erythema or rash. Neurological:      Mental Status: She is alert and oriented to person, place, and time. Cranial Nerves: No cranial nerve deficit.       Coordination: Coordination normal.   Psychiatric:         Mood and Affect: Mood normal.         Behavior: Behavior normal.         Lab and Diagnostic Study Results     Labs -     Recent Results (from the past 12 hour(s))   TYPE & SCREEN    Collection Time: 01/29/21 11:45 AM   Result Value Ref Range    Crossmatch Expiration 02/01/2021,2359     ABO/Rh(D) B Positive     Antibody screen Negative    METABOLIC PANEL, COMPREHENSIVE    Collection Time: 01/29/21 12:20 PM   Result Value Ref Range    Sodium 134 (L) 136 - 145 mmol/L    Potassium 3.6 3.5 - 5.1 mmol/L    Chloride 98 97 - 108 mmol/L    CO2 28 21 - 32 mmol/L    Anion gap 8 5 - 15 mmol/L    Glucose 97 65 - 100 mg/dL    BUN 15 6 - 20 mg/dL    Creatinine 0.56 0.55 - 1.02 mg/dL    BUN/Creatinine ratio 27 (H) 12 - 20      GFR est AA >60 >60 ml/min/1.73m2    GFR est non-AA >60 >60 ml/min/1.73m2    Calcium 8.8 8.5 - 10.1 mg/dL    Bilirubin, total 0.3 0.2 - 1.0 mg/dL    AST (SGOT) 60 (H) 15 - 37 U/L    ALT (SGPT) 33 12 - 78 U/L    Alk. phosphatase 282 (H) 45 - 117 U/L    Protein, total 7.2 6.4 - 8.2 g/dL    Albumin 2.2 (L) 3.5 - 5.0 g/dL    Globulin 5.0 (H) 2.0 - 4.0 g/dL    A-G Ratio 0.4 (L) 1.1 - 2.2     CBC WITH AUTOMATED DIFF    Collection Time: 01/29/21 12:20 PM   Result Value Ref Range    WBC 9.8 3.6 - 11.0 K/uL    RBC 3.68 (L) 3.80 - 5.20 M/uL    HGB 8.2 (L) 11.5 - 16.0 g/dL    HCT 27.7 (L) 35.0 - 47.0 %    MCV 75.3 (L) 80.0 - 99.0 FL    MCH 22.3 (L) 26.0 - 34.0 PG    MCHC 29.6 (L) 30.0 - 36.5 g/dL    RDW 15.9 (H) 11.5 - 14.5 %    PLATELET 284 (H) 840 - 400 K/uL    MPV 8.2 (L) 8.9 - 12.9 FL    NEUTROPHILS 75 32 - 75 %    LYMPHOCYTES 14 12 - 49 %    MONOCYTES 10 5 - 13 %    EOSINOPHILS 1 0 - 7 %    BASOPHILS 0 0 - 1 %    IMMATURE GRANULOCYTES 0 0.0 - 0.5 %    ABS. NEUTROPHILS 7.3 1.8 - 8.0 K/UL    ABS. LYMPHOCYTES 1.3 0.8 - 3.5 K/UL    ABS. MONOCYTES 1.0 0.0 - 1.0 K/UL    ABS. EOSINOPHILS 0.1 0.0 - 0.4 K/UL    ABS. BASOPHILS 0.0 0.0 - 0.1 K/UL    ABS. IMM. GRANS. 0.0 0.00 - 0.04 K/UL    DF AUTOMATED     PROTHROMBIN TIME + INR    Collection Time: 01/29/21 12:20 PM   Result Value Ref Range    Prothrombin time 17.2 (H) 11.9 - 14.7 sec    INR 1.4 (H) 0.9 - 1.1     PTT    Collection Time: 01/29/21 12:20 PM   Result Value Ref Range    aPTT 31.1 23.0 - 35.7 sec    aPTT, therapeutic range   68 - 109 sec       Radiologic Studies -   @[email or web address]@  CT Results  (Last 48 hours)    None        CXR Results  (Last 48 hours)    None            Medical Decision Making   - I am the first provider for this patient. - I reviewed the vital signs, available nursing notes, past medical history, past surgical history, family history and social history. - Initial assessment performed. The patients presenting problems have been discussed, and they are in agreement with the care plan formulated and outlined with them.   I have encouraged them to ask questions as they arise throughout their visit. Vital Signs-Reviewed the patient's vital signs. Patient Vitals for the past 12 hrs:   Temp Pulse Resp BP SpO2   01/29/21 1450  81 21 (!) 145/76 99 %   01/29/21 1350  77 24 130/74 99 %   01/29/21 1250  76 25 124/68 98 %   01/29/21 1226  77 20 120/75 97 %   01/29/21 1140 98.7 °F (37.1 °C) 92 16 108/74 100 %       Records Reviewed: Nursing Notes and Old Medical Records    The patient presents with weight loss and low blood counts With a differential diagnosis of metastatic disease and anemia. Will assess with basic labs and CT of the abdomen      ED Course:   I discussed the case with the patient's primary care physician Dr. Laura Lr, who is thankful for the labs as well as a CT of the abdomen. She is asked that she follow-up with her after these are complete. Provider Notes (Medical Decision Making):   Is a 72-year-old female with known past medical history significant for possible lung CA who presents with large amounts of weight loss but stable labs at this point. Patient did miss a CT of the abdomen the other day that was scheduled by her primary care physician. Her primary care physician has a well-established network of providers to be able to see the patient but just needs these results back. Patient is hemodynamically stable in no acute distress at this point time. Her exam is nonfocal.  Will perform the lab test and CT of the abdomen discharge her to follow-up with her PCP  MDM       Procedures   Medical Decision Makingedical Decision Making  Performed by: Juan Carlos Garner MD  PROCEDURES:  Procedures       Disposition   Disposition: Condition stable        DISCHARGE PLAN:  1. Current Discharge Medication List      CONTINUE these medications which have NOT CHANGED    Details   !! omeprazole (PRILOSEC) 40 mg capsule Take 1 Cap by mouth daily. Take 30 minutes before eating. once a day.   Qty: 30 Cap, Refills: 2      !! omeprazole (PRILOSEC) 40 mg capsule Take 1 Cap by mouth daily. Take one pill 30 minutes before eating once a day  Qty: 90 Cap, Refills: 0      calcium carbonate/vitamin D3 (CALTRATE 600 + D PO) Take 1 Tab by mouth daily. cholecalciferol, vitamin D3, 50 mcg (2,000 unit) tab Take 1 Tab by mouth daily. multivit/iron/FA/K/herb no. 244 (ALIVE WOMEN'S ENERGY PO) Take 1 Tab by mouth daily. !! - Potential duplicate medications found. Please discuss with provider. 2.   Follow-up Information     Follow up With Specialties Details Why Contact Info    Malorie Aly MD Internal Medicine In 1 day  [address]  [phone]          3. Return to ED if worse   4. Current Discharge Medication List            Diagnosis     Clinical Impression:   1. Weight loss    2. Thrombocytosis ([facility] [address]        Attestations:    Marli Wolf MD    Please note that this dictation was completed with [employer], the computer voice recognition software. Quite often unanticipated grammatical, syntax, homophones, and other interpretive errors are inadvertently transcribed by the computer software. Please disregard these errors. Please excuse any errors that have escaped final proofreading. Thank you.

## 2021-02-02 ENCOUNTER — TELEPHONE (OUTPATIENT)
Dept: INTERNAL MEDICINE CLINIC | Age: 71
End: 2021-02-02

## 2021-02-02 NOTE — TELEPHONE ENCOUNTER
Patient advised to call Oncology to schedule an appointment. They had reached out to her to schedule but she wasn't ready to schedule at that time.

## 2021-02-02 NOTE — TELEPHONE ENCOUNTER
Patient said when she was discharged they told her she was anemic. Wants to know if she should take otc supplement or if you'll prescribe something? Please review ed notes.

## 2021-02-04 ENCOUNTER — TELEPHONE (OUTPATIENT)
Dept: INTERNAL MEDICINE CLINIC | Age: 71
End: 2021-02-04

## 2021-02-05 NOTE — TELEPHONE ENCOUNTER
I called the patient explained the seriousness of her medical condition ultimately she has, been scheduled for Monday to see oncologist patient was overwhelmed, patient has also been explained, and I also explained the results of CT abdomen pelvis, she told me she lives by herself she does not have that good relationship with her granddaughter who lives,,  probably in [place]? She has a friend who will take her to the office of oncologist.,  She is made aware that she is having cancer. She has accepted with the diagnosis. I had a long talk. I also had a chance to speak with medical oncologist Dr. Laura Damon and he is also helping a lot to bring her as soon as possible in the office but in between patient was not ready to come to see him last week so scheduled for Monday.

## 2021-02-16 RX ORDER — ASCORBIC ACID 500 MG
500 TABLET ORAL DAILY
COMMUNITY

## 2021-02-16 RX ORDER — CALCIUM CARBONATE 200(500)MG
1 TABLET,CHEWABLE ORAL 2 TIMES DAILY
COMMUNITY

## 2021-02-23 ENCOUNTER — HOSPITAL ENCOUNTER (OUTPATIENT)
Dept: INTERVENTIONAL RADIOLOGY/VASCULAR | Age: 71
Discharge: HOME | End: 2021-02-23
Attending: INTERNAL MEDICINE
Payer: MEDICARE

## 2021-02-23 VITALS
OXYGEN SATURATION: 99 % | HEIGHT: 66 IN | BODY MASS INDEX: 18.64 KG/M2 | HEART RATE: 88 BPM | DIASTOLIC BLOOD PRESSURE: 63 MMHG | SYSTOLIC BLOOD PRESSURE: 103 MMHG | WEIGHT: 116 LBS | RESPIRATION RATE: 18 BRPM | TEMPERATURE: 97.9 F

## 2021-02-23 DIAGNOSIS — R93.5 ABNORMAL ABDOMINAL ULTRASOUND: ICD-10-CM

## 2021-02-23 LAB
ANION GAP SERPL CALC-SCNC: 11 MMOL/L (ref 5–15)
APTT PPP: 29.7 SEC (ref 23–35.7)
BUN SERPL-MCNC: 11 MG/DL (ref 6–20)
BUN/CREAT SERPL: 34 (ref 12–20)
CA-I BLD-MCNC: 9 MG/DL (ref 8.5–10.1)
CHLORIDE SERPL-SCNC: 90 MMOL/L (ref 97–108)
CO2 SERPL-SCNC: 26 MMOL/L (ref 21–32)
CREAT SERPL-MCNC: 0.32 MG/DL (ref 0.55–1.02)
ERYTHROCYTE [DISTWIDTH] IN BLOOD BY AUTOMATED COUNT: 26.3 % (ref 11.5–14.5)
GLUCOSE SERPL-MCNC: 80 MG/DL (ref 65–100)
HCT VFR BLD AUTO: 28.7 % (ref 35–47)
HGB BLD-MCNC: 8.5 G/DL (ref 11.5–16)
INR PPP: 1.6 (ref 0.9–1.1)
MCH RBC QN AUTO: 24.3 PG (ref 26–34)
MCHC RBC AUTO-ENTMCNC: 29.6 G/DL (ref 30–36.5)
MCV RBC AUTO: 82 FL (ref 80–99)
NRBC # BLD: 0 K/UL (ref 0–0.01)
NRBC BLD-RTO: 0 PER 100 WBC
PLATELET # BLD AUTO: 656 K/UL (ref 150–400)
PMV BLD AUTO: 8.8 FL (ref 8.9–12.9)
POTASSIUM SERPL-SCNC: 3.8 MMOL/L (ref 3.5–5.1)
PROTHROMBIN TIME: 18.9 SEC (ref 11.9–14.7)
RBC # BLD AUTO: 3.5 M/UL (ref 3.8–5.2)
SODIUM SERPL-SCNC: 127 MMOL/L (ref 136–145)
THERAPEUTIC RANGE,PTTT: NORMAL SEC (ref 68–109)
WBC # BLD AUTO: 20.3 K/UL (ref 3.6–11)

## 2021-02-23 PROCEDURE — 74011250636 HC RX REV CODE- 250/636: Performed by: RADIOLOGY

## 2021-02-23 PROCEDURE — 88381 MICRODISSECTION MANUAL: CPT

## 2021-02-23 PROCEDURE — 99153 MOD SED SAME PHYS/QHP EA: CPT

## 2021-02-23 PROCEDURE — 85730 THROMBOPLASTIN TIME PARTIAL: CPT

## 2021-02-23 PROCEDURE — 80048 BASIC METABOLIC PNL TOTAL CA: CPT

## 2021-02-23 PROCEDURE — 88333 PATH CONSLTJ SURG CYTO XM 1: CPT

## 2021-02-23 PROCEDURE — 88307 TISSUE EXAM BY PATHOLOGIST: CPT

## 2021-02-23 PROCEDURE — 88341 IMHCHEM/IMCYTCHM EA ADD ANTB: CPT

## 2021-02-23 PROCEDURE — 77030003503 IR BX LIVER PERCUTANEOUS

## 2021-02-23 PROCEDURE — 85027 COMPLETE CBC AUTOMATED: CPT

## 2021-02-23 PROCEDURE — 36415 COLL VENOUS BLD VENIPUNCTURE: CPT

## 2021-02-23 PROCEDURE — 85610 PROTHROMBIN TIME: CPT

## 2021-02-23 PROCEDURE — 88342 IMHCHEM/IMCYTCHM 1ST ANTB: CPT

## 2021-02-23 PROCEDURE — 99152 MOD SED SAME PHYS/QHP 5/>YRS: CPT

## 2021-02-23 RX ORDER — MIDAZOLAM HYDROCHLORIDE 1 MG/ML
.25-2 INJECTION, SOLUTION INTRAMUSCULAR; INTRAVENOUS
Status: DISCONTINUED | OUTPATIENT
Start: 2021-02-23 | End: 2021-03-04 | Stop reason: HOSPADM

## 2021-02-23 RX ORDER — FENTANYL CITRATE 50 UG/ML
25-100 INJECTION, SOLUTION INTRAMUSCULAR; INTRAVENOUS
Status: DISCONTINUED | OUTPATIENT
Start: 2021-02-23 | End: 2021-03-04 | Stop reason: HOSPADM

## 2021-02-23 RX ADMIN — FENTANYL CITRATE 50 MCG: 50 INJECTION INTRAMUSCULAR; INTRAVENOUS at 09:00

## 2021-02-23 RX ADMIN — MIDAZOLAM HYDROCHLORIDE 1 MG: 1 INJECTION, SOLUTION INTRAMUSCULAR; INTRAVENOUS at 09:00

## 2021-02-23 NOTE — PROGRESS NOTES
BANDAID TO RIGHT UPPER ABDOMEN DRY AND INTACT PT LAYING ON RIGHT SIDE AS INSTRUCTED NO DISTRESS NOTED

## 2021-02-26 ENCOUNTER — TRANSCRIBE ORDER (OUTPATIENT)
Dept: SCHEDULING | Age: 71
End: 2021-02-26

## 2021-02-26 DIAGNOSIS — M79.89 LEG SWELLING: Primary | ICD-10-CM

## 2021-03-01 ENCOUNTER — TRANSCRIBE ORDER (OUTPATIENT)
Dept: SCHEDULING | Age: 71
End: 2021-03-01

## 2021-03-01 DIAGNOSIS — M79.89 LEG SWELLING: Primary | ICD-10-CM

## 2021-03-01 DIAGNOSIS — R19.00 ABDOMINAL LUMP: Primary | ICD-10-CM

## 2021-03-01 DIAGNOSIS — D50.9 IRON DEFICIENCY ANEMIA, UNSPECIFIED: ICD-10-CM

## 2021-03-01 DIAGNOSIS — C18.2 MALIGNANT NEOPLASM OF ASCENDING COLON (HCC): ICD-10-CM

## 2021-03-03 ENCOUNTER — TELEPHONE (OUTPATIENT)
Dept: INTERNAL MEDICINE CLINIC | Age: 71
End: 2021-03-03

## 2021-03-03 NOTE — TELEPHONE ENCOUNTER
[address] [facility] called to let you know she is being discharged from [facility] tomorrow. Patient was treated for sepsis with antibiotics, her white blood count was 21 and they put a port in for chemo.

## 2022-03-18 PROBLEM — R63.0 LOSS OF APPETITE: Status: ACTIVE | Noted: 2021-01-26

## 2022-03-18 PROBLEM — F17.210 CIGARETTE SMOKER: Status: ACTIVE | Noted: 2020-11-20

## 2022-03-18 PROBLEM — N76.0 ACUTE VAGINITIS: Status: ACTIVE | Noted: 2020-11-20

## 2022-03-19 PROBLEM — R91.8 ABNORMAL CT LUNG SCREENING: Status: ACTIVE | Noted: 2021-01-25

## 2022-03-19 PROBLEM — F17.200 NICOTINE DEPENDENCE: Status: ACTIVE | Noted: 2020-11-20

## 2022-03-19 PROBLEM — R63.4 WEIGHT LOSS: Status: ACTIVE | Noted: 2021-01-26

## 2022-03-19 PROBLEM — E55.9 VITAMIN D DEFICIENCY: Status: ACTIVE | Noted: 2020-11-20

## 2022-03-19 PROBLEM — R11.0 NAUSEA: Status: ACTIVE | Noted: 2021-01-26

## 2022-03-20 PROBLEM — E78.00 PURE HYPERCHOLESTEROLEMIA: Status: ACTIVE | Noted: 2020-11-20

## 2022-03-20 PROBLEM — R53.83 FATIGUE: Status: ACTIVE | Noted: 2020-11-20

## 2022-03-20 PROBLEM — M85.80 OSTEOPENIA: Status: ACTIVE | Noted: 2020-11-20

## 2023-05-22 RX ORDER — OMEPRAZOLE 40 MG/1
40 CAPSULE, DELAYED RELEASE ORAL DAILY
COMMUNITY
Start: 2021-01-26

## 2023-05-22 RX ORDER — UREA 10 %
1 LOTION (ML) TOPICAL 2 TIMES DAILY
COMMUNITY

## 2023-05-22 RX ORDER — ASCORBIC ACID 500 MG
500 TABLET ORAL DAILY
COMMUNITY